# Patient Record
Sex: FEMALE
[De-identification: names, ages, dates, MRNs, and addresses within clinical notes are randomized per-mention and may not be internally consistent; named-entity substitution may affect disease eponyms.]

---

## 2024-05-01 ENCOUNTER — APPOINTMENT (OUTPATIENT)
Dept: CARDIOLOGY | Facility: CLINIC | Age: 59
End: 2024-05-01
Payer: COMMERCIAL

## 2024-05-01 VITALS
HEIGHT: 65 IN | HEART RATE: 89 BPM | WEIGHT: 164 LBS | SYSTOLIC BLOOD PRESSURE: 142 MMHG | DIASTOLIC BLOOD PRESSURE: 90 MMHG | BODY MASS INDEX: 27.32 KG/M2

## 2024-05-01 DIAGNOSIS — Z78.9 OTHER SPECIFIED HEALTH STATUS: ICD-10-CM

## 2024-05-01 DIAGNOSIS — R94.31 ABNORMAL ELECTROCARDIOGRAM [ECG] [EKG]: ICD-10-CM

## 2024-05-01 DIAGNOSIS — E78.5 HYPERLIPIDEMIA, UNSPECIFIED: ICD-10-CM

## 2024-05-01 DIAGNOSIS — Z00.00 ENCOUNTER FOR GENERAL ADULT MEDICAL EXAMINATION W/OUT ABNORMAL FINDINGS: ICD-10-CM

## 2024-05-01 PROCEDURE — 93000 ELECTROCARDIOGRAM COMPLETE: CPT

## 2024-05-01 PROCEDURE — 99204 OFFICE O/P NEW MOD 45 MIN: CPT | Mod: 25

## 2024-05-01 RX ORDER — LEVOTHYROXINE SODIUM 0.1 MG/1
100 TABLET ORAL DAILY
Refills: 0 | Status: ACTIVE | COMMUNITY

## 2024-05-01 NOTE — HISTORY OF PRESENT ILLNESS
[FreeTextEntry1] : Ms. Fuentes is a 58-year-old woman with history of thyroid cancer s/p resection and dyslipidemia presenting for preventive cardiac care.  Patient reports generally being in good health. She denies any significant exertional dyspnea or chest discomfort (some fatigue with brisk walks up-hill). No family history of premature ASCVD. No prior history of tobacco use.  ECG shows NSR, normal axis, normal intervals (misread by the computer as anterior infarct).  Labs: - , , , HDL 73, non- (10-year-ASCVD 2.9%) - Cr 0.7, K 4.3, normal transaminases - A1c 5.5%, TSH 0.67  Meds: - Levothyroxine 100mcg daily

## 2024-05-01 NOTE — ASSESSMENT
[FreeTextEntry1] : Ms. Fuentes is a 58-year-old woman with history of thyroid cancer s/p resection and dyslipidemia presenting for preventive cardiac care.  Impression: (1) Dyslipidemia, low 10-year ASCVD risk, ,  (2) Abnormal ECG (3) Thyroid CA s/p resection, not active  Plan: - CAC score - TTE to assess abnormal ECG - Lifestyle modification including dietary changes discussed  RTC 6 months with labs, sooner if abnormal CAC or TTE

## 2024-11-06 ENCOUNTER — APPOINTMENT (OUTPATIENT)
Dept: CARDIOLOGY | Facility: CLINIC | Age: 59
End: 2024-11-06